# Patient Record
Sex: FEMALE | Race: WHITE
[De-identification: names, ages, dates, MRNs, and addresses within clinical notes are randomized per-mention and may not be internally consistent; named-entity substitution may affect disease eponyms.]

---

## 2020-11-25 ENCOUNTER — HOSPITAL ENCOUNTER (INPATIENT)
Dept: HOSPITAL 75 - ER FS | Age: 60
LOS: 5 days | Discharge: HOME | DRG: 177 | End: 2020-11-30
Attending: INTERNAL MEDICINE | Admitting: INTERNAL MEDICINE
Payer: SELF-PAY

## 2020-11-25 VITALS — HEIGHT: 62.99 IN | BODY MASS INDEX: 42.19 KG/M2 | WEIGHT: 238.1 LBS

## 2020-11-25 VITALS — SYSTOLIC BLOOD PRESSURE: 135 MMHG | DIASTOLIC BLOOD PRESSURE: 64 MMHG

## 2020-11-25 VITALS — DIASTOLIC BLOOD PRESSURE: 67 MMHG | SYSTOLIC BLOOD PRESSURE: 150 MMHG

## 2020-11-25 DIAGNOSIS — J96.01: ICD-10-CM

## 2020-11-25 DIAGNOSIS — E11.65: ICD-10-CM

## 2020-11-25 DIAGNOSIS — Z73.0: ICD-10-CM

## 2020-11-25 DIAGNOSIS — T38.0X5A: ICD-10-CM

## 2020-11-25 DIAGNOSIS — N17.9: ICD-10-CM

## 2020-11-25 DIAGNOSIS — E66.01: ICD-10-CM

## 2020-11-25 DIAGNOSIS — Z79.84: ICD-10-CM

## 2020-11-25 DIAGNOSIS — I10: ICD-10-CM

## 2020-11-25 DIAGNOSIS — U07.1: Primary | ICD-10-CM

## 2020-11-25 DIAGNOSIS — E11.10: ICD-10-CM

## 2020-11-25 LAB
ALBUMIN SERPL-MCNC: 3.4 GM/DL (ref 3.2–4.5)
ALP SERPL-CCNC: 87 U/L (ref 40–136)
ALT SERPL-CCNC: 13 U/L (ref 0–55)
BASOPHILS # BLD AUTO: 0.1 10^3/UL (ref 0–0.1)
BASOPHILS NFR BLD AUTO: 1 % (ref 0–10)
BASOPHILS NFR BLD MANUAL: 0 %
BILIRUB SERPL-MCNC: 0.4 MG/DL (ref 0.1–1)
BUN/CREAT SERPL: 30
CALCIUM SERPL-MCNC: 9.3 MG/DL (ref 8.5–10.1)
CHLORIDE SERPL-SCNC: 106 MMOL/L (ref 98–107)
CO2 SERPL-SCNC: 16 MMOL/L (ref 21–32)
CREAT SERPL-MCNC: 0.73 MG/DL (ref 0.6–1.3)
EOSINOPHIL # BLD AUTO: 0 10^3/UL (ref 0–0.3)
EOSINOPHIL NFR BLD AUTO: 0 % (ref 0–10)
EOSINOPHIL NFR BLD MANUAL: 0 %
GFR SERPLBLD BASED ON 1.73 SQ M-ARVRAT: > 60 ML/MIN
GLUCOSE SERPL-MCNC: 183 MG/DL (ref 70–105)
HCT VFR BLD CALC: 38 % (ref 35–52)
HGB BLD-MCNC: 12.5 G/DL (ref 11.5–16)
LYMPHOCYTES # BLD AUTO: 0.8 X 10^3 (ref 1–4)
LYMPHOCYTES NFR BLD AUTO: 14 % (ref 12–44)
MANUAL DIFFERENTIAL PERFORMED BLD QL: YES
MCH RBC QN AUTO: 30 PG (ref 25–34)
MCHC RBC AUTO-ENTMCNC: 33 G/DL (ref 32–36)
MCV RBC AUTO: 89 FL (ref 80–99)
MONOCYTES # BLD AUTO: 0.2 X 10^3 (ref 0–1)
MONOCYTES NFR BLD AUTO: 4 % (ref 0–12)
MONOCYTES NFR BLD: 4 %
MYELOCYTES NFR BLD: 3 %
NEUTROPHILS # BLD AUTO: 4.6 X 10^3 (ref 1.8–7.8)
NEUTROPHILS NFR BLD AUTO: 76 % (ref 42–75)
NEUTS BAND NFR BLD MANUAL: 56 %
NEUTS BAND NFR BLD: 24 %
PLATELET # BLD: 295 10^3/UL (ref 130–400)
PMV BLD AUTO: 9.8 FL (ref 7.4–10.4)
POTASSIUM SERPL-SCNC: 4 MMOL/L (ref 3.6–5)
PROT SERPL-MCNC: 7.4 GM/DL (ref 6.4–8.2)
RBC MORPH BLD: NORMAL
SODIUM SERPL-SCNC: 141 MMOL/L (ref 135–145)
VARIANT LYMPHS NFR BLD MANUAL: 1 %
VARIANT LYMPHS NFR BLD MANUAL: 10 %
VARIANT LYMPHS NFR BLD MANUAL: 2 %
WBC # BLD AUTO: 6 10^3/UL (ref 4.3–11)

## 2020-11-25 PROCEDURE — 87040 BLOOD CULTURE FOR BACTERIA: CPT

## 2020-11-25 PROCEDURE — 85007 BL SMEAR W/DIFF WBC COUNT: CPT

## 2020-11-25 PROCEDURE — 36415 COLL VENOUS BLD VENIPUNCTURE: CPT

## 2020-11-25 PROCEDURE — 83036 HEMOGLOBIN GLYCOSYLATED A1C: CPT

## 2020-11-25 PROCEDURE — 87804 INFLUENZA ASSAY W/OPTIC: CPT

## 2020-11-25 PROCEDURE — 86900 BLOOD TYPING SEROLOGIC ABO: CPT

## 2020-11-25 PROCEDURE — 80048 BASIC METABOLIC PNL TOTAL CA: CPT

## 2020-11-25 PROCEDURE — 84145 PROCALCITONIN (PCT): CPT

## 2020-11-25 PROCEDURE — 82962 GLUCOSE BLOOD TEST: CPT

## 2020-11-25 PROCEDURE — 71045 X-RAY EXAM CHEST 1 VIEW: CPT

## 2020-11-25 PROCEDURE — 86850 RBC ANTIBODY SCREEN: CPT

## 2020-11-25 PROCEDURE — 83605 ASSAY OF LACTIC ACID: CPT

## 2020-11-25 PROCEDURE — 94761 N-INVAS EAR/PLS OXIMETRY MLT: CPT

## 2020-11-25 PROCEDURE — 82010 KETONE BODYS QUAN: CPT

## 2020-11-25 PROCEDURE — 86901 BLOOD TYPING SEROLOGIC RH(D): CPT

## 2020-11-25 PROCEDURE — 80053 COMPREHEN METABOLIC PANEL: CPT

## 2020-11-25 PROCEDURE — 85379 FIBRIN DEGRADATION QUANT: CPT

## 2020-11-25 PROCEDURE — 85027 COMPLETE CBC AUTOMATED: CPT

## 2020-11-25 PROCEDURE — 85025 COMPLETE CBC W/AUTO DIFF WBC: CPT

## 2020-11-25 PROCEDURE — 94760 N-INVAS EAR/PLS OXIMETRY 1: CPT

## 2020-11-25 RX ADMIN — SODIUM CHLORIDE SCH MLS/HR: 900 INJECTION INTRAVENOUS at 22:56

## 2020-11-25 RX ADMIN — METOPROLOL SUCCINATE SCH MG: 50 TABLET, EXTENDED RELEASE ORAL at 22:57

## 2020-11-25 RX ADMIN — ENOXAPARIN SODIUM SCH MG: 100 INJECTION SUBCUTANEOUS at 22:57

## 2020-11-25 NOTE — DIAGNOSTIC IMAGING REPORT
EXAMINATION: Chest 1 view



HISTORY: Hypoxia, Covid.



COMPARISON: None available.



FINDINGS: 



There are severe bilateral airspace opacities throughout both

lungs. Right costophrenic angle is excluded. No pleural effusion

or pneumothorax is seen. Heart size is normal.



IMPRESSION: 



1. Severe bilateral airspace opacities throughout both lungs

consistent with history of COVID-19 infection.



Dictated by: 



  Dictated on workstation # [email or web address]

## 2020-11-25 NOTE — ED COUGH/URI
General


Chief Complaint:  Cough/Cold/Flu Symptoms


Stated Complaint:  LOW O2


Nursing Triage Note:  


Sent to ED from [facility]. Was seen today due to cough, sore throat, and lost 


voice x 2.5 weeks. States her other symptoms seem to be improving, but she lost 


her voice again. Was sent from clinic due to O2 sats of 88% on room air. Brought




to ED on portable oxygen with O2 sats 92%. Denies fever or shortness of breath 


but is tachypneic at 34 respiration/minute. Was rapid tested at clinic and is 


covid positive.


Sepsis Screen:  No Definite Risk


Source:  patient


Exam Limitations:  no limitations





History of Present Illness


Date Seen by Provider:  Nov 25, 2020


Time Seen by Provider:  16:00


Initial Comments


60-year-old female presents to the ER by private vehicle after she was seen in 

the [facility] today for 2-1/2 weeks of upper respiratory symptoms 

and cough.  A rapid COVID test was positive and her oxygen saturation on room 

air was 88 percent, so advised to come to the ER.  Patient denies shortness of 

breath except with exertion, denies chest pain, chest tightness, fever or 

chills, nausea vomiting or diarrhea.  States she is actually feeling better.





Allergies and Home Medications


Allergies


Coded Allergies:  


     metronidazole (Verified  Allergy, Unknown, vomiting, 11/25/20)


     erythromycin base (Verified  Adverse Reaction, Unknown, vomiting, 11/25/20)





Patient Home Medication List


Home Medication List Reviewed:  Yes





Review of Systems


Review of Systems


Constitutional:  see HPI; No chills, No fever, No malaise, No weakness


EENTM:  No throat pain, No throat swelling


Respiratory:  cough; No hemoptysis, No orthopnea; short of breath


Cardiovascular:  No chest pain, No edema, No palpitations, No syncope


Gastrointestinal:  No abdominal pain, No diarrhea, No nausea, No vomiting


Musculoskeletal:  No back pain, No joint pain





Past Medical-Social-Family Hx


Past Med/Social Hx:  Reviewed Nursing Past Med/Soc Hx


Patient Social History


Alcohol Use:  Denies Use


Recreational Drug Use:  No


Smoking Status:  Never a Smoker


2nd Hand Smoke Exposure:  No


Recent Foreign Travel:  No


Contact w/Someone Who Travel:  No


Recent Infectious Disease Expo:  No


Recent Hopitalizations:  No


Physical Abuse:  No


Sexual Abuse:  No


Mistreated:  No


Fear:  No





Seasonal Allergies


Seasonal Allergies:  No





Past Medical History


Surgeries:  Yes


Orthopedic, Tubal Ligation


Respiratory:  No


Cardiac:  Yes


Hypertension


Neurological:  No


Genitourinary:  No


Gastrointestinal:  No


Musculoskeletal:  No


Endocrine:  Yes


Diabetes, Non-Insulin dep


HEENT:  No


Cancer:  No


Psychosocial:  No


Integumentary:  No


Blood Disorders:  No


Adverse Reaction/Blood Tranf:  No





Physical Exam





Vital Signs - First Documented








 11/25/20





 15:09


 


Temp 36.4


 


Pulse 71


 


Resp 34


 


B/P (MAP) 145/64 (91)


 


Pulse Ox 92


 


O2 Delivery Nasal Cannula


 


O2 Flow Rate 3.00





Capillary Refill : Less Than 3 Seconds


Height: '"


Weight: lbs. oz. kg;  BMI


Method:


General Appearance:  WD/WN, no apparent distress


HEENT:  PERRL/EOMI, normal ENT inspection, TMs normal, pharynx normal


Neck:  supple, normal inspection


Respiratory:  chest non-tender, lungs clear, normal breath sounds, no 

respiratory distress, no accessory muscle use


Cardiovascular:  regular rate, rhythm, no edema, no gallop, no JVD


Gastrointestinal:  non tender, soft


Extremities:  non-tender


Neurologic/Psychiatric:  no motor/sensory deficits, alert, normal mood/affect





Progress/Results/Core Measures


Suspected Sepsis


Recent Fever Within 48 Hours:  No


Infection Criteria Present:  Documented Infection


New/Unexplained  Altered Menta:  No


Sepsis Screen:  No Definite Risk


SIRS


Temperature: 


Pulse: 71 


Respiratory Rate: 34


 


Laboratory Tests


11/25/20 15:20: White Blood Count 6.0


Blood Pressure 145 /64 


Mean: 91


 





Laboratory Tests


11/25/20 15:20: 


Creatinine 0.73, Platelet Count 295, Total Bilirubin 0.4








Results/Orders


Lab Results





Laboratory Tests








Test


 11/25/20


15:20 Range/Units


 


 


White Blood Count


 6.0 


 4.3-11.0


10^3/uL


 


Red Blood Count


 4.22 L


 4.35-5.85


10^6/uL


 


Hemoglobin 12.5  11.5-16.0  G/DL


 


Hematocrit 38  35-52  %


 


Mean Corpuscular Volume 89  80-99  FL


 


Mean Corpuscular Hemoglobin 30  25-34  PG


 


Mean Corpuscular Hemoglobin


Concent 33 


 32-36  G/DL





 


Red Cell Distribution Width 13.5  10.0-14.5  %


 


Platelet Count


 295 


 130-400


10^3/uL


 


Mean Platelet Volume 9.8  7.4-10.4  FL


 


Immature Granulocyte % (Auto) 5   %


 


Neutrophils (%) (Auto) 76 H 42-75  %


 


Lymphocytes (%) (Auto) 14  12-44  %


 


Monocytes (%) (Auto) 4  0-12  %


 


Eosinophils (%) (Auto) 0  0-10  %


 


Basophils (%) (Auto) 1  0-10  %


 


Neutrophils # (Auto) 4.6  1.8-7.8  X 10^3


 


Lymphocytes # (Auto) 0.8 L 1.0-4.0  X 10^3


 


Monocytes # (Auto) 0.2  0.0-1.0  X 10^3


 


Eosinophils # (Auto)


 0.0 


 0.0-0.3


10^3/uL


 


Basophils # (Auto)


 0.1 


 0.0-0.1


10^3/uL


 


Immature Granulocyte # (Auto)


 0.3 H


 0.0-0.1


10^3/uL


 


Neutrophils % (Manual) 56   %


 


Lymphocytes % (Manual) 10   %


 


Monocytes % (Manual) 4   %


 


Eosinophils % (Manual) 0   %


 


Basophils % (Manual) 0   %


 


Myelocytes % 3   %


 


Band Neutrophils 24   %


 


Atypical Lymphocytes 2   %


 


Reactive Lymphocytes 1   %


 


Blood Morphology Comment NORMAL   


 


Sodium Level 141  135-145  MMOL/L


 


Potassium Level 4.0  3.6-5.0  MMOL/L


 


Chloride Level 106    MMOL/L


 


Carbon Dioxide Level 16 L 21-32  MMOL/L


 


Anion Gap 19 H 5-14  MMOL/L


 


Blood Urea Nitrogen 22 H 7-18  MG/DL


 


Creatinine


 0.73 


 0.60-1.30


MG/DL


 


Estimat Glomerular Filtration


Rate > 60 


  





 


BUN/Creatinine Ratio 30   


 


Glucose Level 183 H   MG/DL


 


Calcium Level 9.3  8.5-10.1  MG/DL


 


Corrected Calcium 9.8  8.5-10.1  MG/DL


 


Total Bilirubin 0.4  0.1-1.0  MG/DL


 


Aspartate Amino Transf


(AST/SGOT) 32 


 5-34  U/L





 


Alanine Aminotransferase


(ALT/SGPT) 13 


 0-55  U/L





 


Alkaline Phosphatase 87    U/L


 


Total Protein 7.4  6.4-8.2  GM/DL


 


Albumin 3.4  3.2-4.5  GM/DL








My Orders





Orders - ROVENSTINE,MÓNICA L DO


Ed Iv/Invasive Line Start (11/25/20 16:42)


Cbc With Automated Diff (11/25/20 16:42)


Comprehensive Metabolic Panel (11/25/20 16:42)


Chest 1 View Ap/Pa Only (11/25/20 16:42)


Dexamethasone Injection (Decadron Inje (11/26/20 09:00)


Albuterol/Ipra Inhalation Soln (Duoneb I (11/25/20 16:45)


Svn Small Volume Nebulizer (11/25/20 16:42)


Dexamethasone Injection (Decadron Inje (11/25/20 16:50)


Manual Differential (11/25/20 15:20)


Influenza A And B Antigens (11/25/20 17:52)





Medications Given in ED





Current Medications








 Medications  Dose


 Ordered  Sig/Pushpa


 Route  Start Time


 Stop Time Status Last Admin


Dose Admin


 


 Albuterol/


 Ipratropium  3 ml  ONCE  ONCE


 INH  11/25/20 16:45


 11/25/20 16:46 DC 11/25/20 17:01


3 ML








Vital Signs/I&O











 11/25/20 11/25/20





 15:09 15:14


 


Temp 36.4 


 


Pulse 71 


 


Resp 34 


 


B/P (MAP) 145/64 (91) 


 


Pulse Ox 92 


 


O2 Delivery Nasal Cannula Nasal Cannula


 


O2 Flow Rate 3.00 





Capillary Refill : Less Than 3 Seconds








Blood Pressure Mean:                    91








Progress Note :  


Progress Note


Patient at the tail end of her illness, having symptoms for 2-1/2 weeks and 

today being the first day she is sought any type of medical care.  See history 

of present illness for details, patient requiring oxygen to remain above 90 

percent and desats as low as 82 percent on room air with a short walk in the ER.

 Denies history of lung disease, sleep apnea or ever needing oxygen prior to 

today.





Diagnostic Imaging





   Diagonstic Imaging:  Xray


Comments


Date of Exam:11/25/20





CHEST 1 VIEW AP/PA ONLY








EXAMINATION: Chest 1 view





HISTORY: Hypoxia, Covid.





COMPARISON: None available.





FINDINGS: 





There are severe bilateral airspace opacities throughout both


lungs. Right costophrenic angle is excluded. No pleural effusion


or pneumothorax is seen. Heart size is normal.





IMPRESSION: 





1. Severe bilateral airspace opacities throughout both lungs


consistent with history of COVID-19 infection.





Dictated by: 





  Dictated on workstation # [email or web address]








Dict:   11/25/20 1702


Trans:   11/25/20 0969


[facility] [id number]





Interpreted by:     MICHELLE MENCHACA MD


Electronically signed by: MICHELLE MENCHACA MD 11/25/20 1719





Departure


Communication (Admissions)


Time/Spoke to Admitting Phy:  17:50


spoke to Dr Downing who accepts for admission





Impression





   Primary Impression:  


   COVID-19


   Additional Impression:  


   Hypoxia


Disposition:  01 HOME, SELF-CARE


Condition:  Stable





Admissions


Decision to Admit Reason:  Admit from ER (General)


Decision to Admit/Date:  Nov 25, 2020


Time/Decision to Admit Time:  17:50





Departure-Patient Inst.





Add. Discharge Instructions:  








All discharge instructions reviewed with patient and/or family. Voiced 

understanding.











MÓNICA ESCALONA DO         Nov 25, 2020 16:24

## 2020-11-26 VITALS — SYSTOLIC BLOOD PRESSURE: 126 MMHG | DIASTOLIC BLOOD PRESSURE: 81 MMHG

## 2020-11-26 VITALS — DIASTOLIC BLOOD PRESSURE: 74 MMHG | SYSTOLIC BLOOD PRESSURE: 157 MMHG

## 2020-11-26 VITALS — DIASTOLIC BLOOD PRESSURE: 72 MMHG | SYSTOLIC BLOOD PRESSURE: 143 MMHG

## 2020-11-26 VITALS — SYSTOLIC BLOOD PRESSURE: 143 MMHG | DIASTOLIC BLOOD PRESSURE: 78 MMHG

## 2020-11-26 VITALS — DIASTOLIC BLOOD PRESSURE: 72 MMHG | SYSTOLIC BLOOD PRESSURE: 151 MMHG

## 2020-11-26 VITALS — SYSTOLIC BLOOD PRESSURE: 152 MMHG | DIASTOLIC BLOOD PRESSURE: 68 MMHG

## 2020-11-26 VITALS — SYSTOLIC BLOOD PRESSURE: 112 MMHG | DIASTOLIC BLOOD PRESSURE: 74 MMHG

## 2020-11-26 LAB
ALBUMIN SERPL-MCNC: 3.4 GM/DL (ref 3.2–4.5)
ALP SERPL-CCNC: 77 U/L (ref 40–136)
ALT SERPL-CCNC: 14 U/L (ref 0–55)
BASOPHILS # BLD AUTO: 0.1 10^3/UL (ref 0–0.1)
BASOPHILS NFR BLD AUTO: 1 % (ref 0–10)
BILIRUB SERPL-MCNC: 0.4 MG/DL (ref 0.1–1)
BUN/CREAT SERPL: 22
BUN/CREAT SERPL: 24
BUN/CREAT SERPL: 24
BUN/CREAT SERPL: 26
CALCIUM SERPL-MCNC: 8.8 MG/DL (ref 8.5–10.1)
CALCIUM SERPL-MCNC: 9.1 MG/DL (ref 8.5–10.1)
CALCIUM SERPL-MCNC: 9.2 MG/DL (ref 8.5–10.1)
CALCIUM SERPL-MCNC: 9.4 MG/DL (ref 8.5–10.1)
CHLORIDE SERPL-SCNC: 104 MMOL/L (ref 98–107)
CHLORIDE SERPL-SCNC: 105 MMOL/L (ref 98–107)
CHLORIDE SERPL-SCNC: 106 MMOL/L (ref 98–107)
CHLORIDE SERPL-SCNC: 106 MMOL/L (ref 98–107)
CO2 SERPL-SCNC: 10 MMOL/L (ref 21–32)
CO2 SERPL-SCNC: 11 MMOL/L (ref 21–32)
CO2 SERPL-SCNC: 11 MMOL/L (ref 21–32)
CO2 SERPL-SCNC: 12 MMOL/L (ref 21–32)
CREAT SERPL-MCNC: 1.08 MG/DL (ref 0.6–1.3)
CREAT SERPL-MCNC: 1.11 MG/DL (ref 0.6–1.3)
CREAT SERPL-MCNC: 1.16 MG/DL (ref 0.6–1.3)
CREAT SERPL-MCNC: 1.22 MG/DL (ref 0.6–1.3)
EOSINOPHIL # BLD AUTO: 0 10^3/UL (ref 0–0.3)
EOSINOPHIL NFR BLD AUTO: 0 % (ref 0–10)
GFR SERPLBLD BASED ON 1.73 SQ M-ARVRAT: 45 ML/MIN
GFR SERPLBLD BASED ON 1.73 SQ M-ARVRAT: 48 ML/MIN
GFR SERPLBLD BASED ON 1.73 SQ M-ARVRAT: 50 ML/MIN
GFR SERPLBLD BASED ON 1.73 SQ M-ARVRAT: 52 ML/MIN
GLUCOSE SERPL-MCNC: 286 MG/DL (ref 70–105)
GLUCOSE SERPL-MCNC: 293 MG/DL (ref 70–105)
GLUCOSE SERPL-MCNC: 313 MG/DL (ref 70–105)
GLUCOSE SERPL-MCNC: 329 MG/DL (ref 70–105)
HCT VFR BLD CALC: 39 % (ref 35–52)
HGB BLD-MCNC: 12.1 G/DL (ref 11.5–16)
LYMPHOCYTES # BLD AUTO: 0.6 10^3/UL (ref 1–4)
LYMPHOCYTES NFR BLD AUTO: 13 % (ref 12–44)
MANUAL DIFFERENTIAL PERFORMED BLD QL: NO
MCH RBC QN AUTO: 29 PG (ref 25–34)
MCHC RBC AUTO-ENTMCNC: 31 G/DL (ref 32–36)
MCV RBC AUTO: 94 FL (ref 80–99)
MONOCYTES # BLD AUTO: 0.1 10^3/UL (ref 0–1)
MONOCYTES NFR BLD AUTO: 2 % (ref 0–12)
NEUTROPHILS # BLD AUTO: 3.4 10^3/UL (ref 1.8–7.8)
NEUTROPHILS NFR BLD AUTO: 75 % (ref 42–75)
PLATELET # BLD: 281 10^3/UL (ref 130–400)
PMV BLD AUTO: 9.9 FL (ref 9–12.2)
POTASSIUM SERPL-SCNC: 4.5 MMOL/L (ref 3.6–5)
POTASSIUM SERPL-SCNC: 4.6 MMOL/L (ref 3.6–5)
POTASSIUM SERPL-SCNC: 4.7 MMOL/L (ref 3.6–5)
POTASSIUM SERPL-SCNC: 5 MMOL/L (ref 3.6–5)
PROT SERPL-MCNC: 7.1 GM/DL (ref 6.4–8.2)
SODIUM SERPL-SCNC: 137 MMOL/L (ref 135–145)
SODIUM SERPL-SCNC: 137 MMOL/L (ref 135–145)
SODIUM SERPL-SCNC: 140 MMOL/L (ref 135–145)
SODIUM SERPL-SCNC: 141 MMOL/L (ref 135–145)
WBC # BLD AUTO: 4.6 10^3/UL (ref 4.3–11)

## 2020-11-26 RX ADMIN — POTASSIUM CHLORIDE SCH MLS/HR: 200 INJECTION, SOLUTION INTRAVENOUS at 17:43

## 2020-11-26 RX ADMIN — ENOXAPARIN SODIUM SCH MG: 100 INJECTION SUBCUTANEOUS at 09:17

## 2020-11-26 RX ADMIN — ACETAMINOPHEN PRN MG: 325 TABLET ORAL at 23:03

## 2020-11-26 RX ADMIN — METOPROLOL SUCCINATE SCH MG: 50 TABLET, EXTENDED RELEASE ORAL at 21:38

## 2020-11-26 RX ADMIN — INSULIN ASPART SCH UNIT: 100 INJECTION, SOLUTION INTRAVENOUS; SUBCUTANEOUS at 16:00

## 2020-11-26 RX ADMIN — DEXTROSE MONOHYDRATE AND SODIUM CHLORIDE SCH MLS/HR: 5; .45 INJECTION, SOLUTION INTRAVENOUS at 17:44

## 2020-11-26 RX ADMIN — LOSARTAN POTASSIUM SCH MG: 50 TABLET, FILM COATED ORAL at 09:16

## 2020-11-26 RX ADMIN — SENNOSIDES SCH MG: 8.6 TABLET, FILM COATED ORAL at 09:16

## 2020-11-26 RX ADMIN — DOCUSATE SODIUM SCH MG: 100 CAPSULE ORAL at 21:38

## 2020-11-26 RX ADMIN — POTASSIUM CHLORIDE SCH MLS/HR: 200 INJECTION, SOLUTION INTRAVENOUS at 17:30

## 2020-11-26 RX ADMIN — POTASSIUM CHLORIDE SCH MLS/HR: 200 INJECTION, SOLUTION INTRAVENOUS at 14:30

## 2020-11-26 RX ADMIN — POTASSIUM CHLORIDE SCH MLS/HR: 200 INJECTION, SOLUTION INTRAVENOUS at 23:23

## 2020-11-26 RX ADMIN — SODIUM CHLORIDE SCH MLS/HR: 4.5 INJECTION, SOLUTION INTRAVENOUS at 20:17

## 2020-11-26 RX ADMIN — ENOXAPARIN SODIUM SCH MG: 100 INJECTION SUBCUTANEOUS at 23:04

## 2020-11-26 RX ADMIN — DOCUSATE SODIUM SCH MG: 100 CAPSULE ORAL at 09:16

## 2020-11-26 RX ADMIN — AZITHROMYCIN SCH MG: 250 TABLET, FILM COATED ORAL at 21:38

## 2020-11-26 RX ADMIN — SENNOSIDES SCH MG: 8.6 TABLET, FILM COATED ORAL at 21:38

## 2020-11-26 RX ADMIN — SODIUM CHLORIDE SCH MLS/HR: 4.5 INJECTION, SOLUTION INTRAVENOUS at 14:30

## 2020-11-26 RX ADMIN — INSULIN ASPART SCH UNIT: 100 INJECTION, SOLUTION INTRAVENOUS; SUBCUTANEOUS at 14:26

## 2020-11-26 RX ADMIN — POTASSIUM CHLORIDE SCH MLS/HR: 200 INJECTION, SOLUTION INTRAVENOUS at 20:13

## 2020-11-26 RX ADMIN — INSULIN ASPART SCH UNIT: 100 INJECTION, SOLUTION INTRAVENOUS; SUBCUTANEOUS at 06:25

## 2020-11-26 RX ADMIN — INSULIN ASPART SCH UNIT: 100 INJECTION, SOLUTION INTRAVENOUS; SUBCUTANEOUS at 23:44

## 2020-11-26 RX ADMIN — SODIUM CHLORIDE SCH MLS/HR: 900 INJECTION INTRAVENOUS at 23:03

## 2020-11-26 NOTE — HISTORY & PHYSICAL-HOSPITALIST
History of Present Illness


HPI/Chief Complaint


Tabby Estrada is a 60-year-old female with past medical history of hypertension,

diabetes, morbid obesity, who presented with hypoxia.  She had been out of the 

doctor's visit and was found to be hypoxic and was referred to the emergency 

room.  She had been diagnosed with COVID-19 about 2-1/2 weeks ago.  He states 

that she is beginning to feel better.  She denies shortness of breath.  She 

denies fevers.  She denies abdominal pain, nausea, and vomiting.  She denies 

diarrhea.  She has been eating and drinking.  She takes metformin and Farxiga 

for diabetes.


Source:  patient


Exam Limitations:  no limitations


Date Seen


20


Time Seen by a Provider:  10:55


Attending Physician


Mindi Denny MD


PCP


Kaylene Quezada


Referring Physician





Date of Admission


2020 at 20:40





Home Medications & Allergies


Home Medications


Reviewed patient Home Medication Reconciliation performed by pharmacy medication

reconciliations technician and/or nursing.


Patients Allergies have been reviewed.





Allergies





Allergies


Coded Allergies


  metronidazole (Verified Allergy, Unknown, vomiting, 20)


  erythromycin base (Verified Adverse Reaction, Unknown, vomiting, 20)








Past Medical-Social-Family Hx


Past Med/Social Hx:  Reviewed Nursing Past Med/Soc Hx


Patient Social History


Alcohol Use:  Denies Use


Recreational Drug Use:  No


Smoking Status:  Never a Smoker


2nd Hand Smoke Exposure:  No


Recent Foreign Travel:  No


Contact w/other who traveled:  No


Recent Hopitalizations:  No


Recent Infectious Disease Expo:  No





Seasonal Allergies


Seasonal Allergies:  No





Past Medical History


Surgeries:  Orthopedic, Tubal Ligation


Cardiac:  Hypertension


Endocrine:  Diabetes, Non-Insulin dep


History of Blood Disorders:  No


Adverse Reaction to Blood [place]:  No





Review of Systems


Constitutional:  no symptoms reported, see HPI





Physical Exam


Physical Exam


Vital Signs





Vital Signs - First Documented








 20





 15:09


 


Temp 36.4


 


Pulse 71


 


Resp 34


 


B/P (MAP) 145/64 (91)


 


Pulse Ox 92


 


O2 Delivery Nasal Cannula


 


O2 Flow Rate 3.00





Capillary Refill : Less Than 3 Seconds


Height, Weight, BMI


Height: '"


Weight: lbs. oz. kg; 42.18 BMI


Method:


General Appearance:  No Apparent Distress, Obese


HEENT:  PERRL/EOMI, Pharynx Normal


Neck:  Normal Inspection, Supple


Respiratory:  Lungs Clear, Normal Breath Sounds, No Respiratory Distress


Cardiovascular:  Regular Rate, Rhythm, No Murmur


Gastrointestinal:  Normal Bowel Sounds, Non Tender, Soft


Extremity:  Normal Inspection, Non Tender, Pedal Edema


Neurologic/Psychiatric:  Alert, Oriented x3, No Motor/Sensory Deficits, Normal 

Mood/Affect


Skin:  Normal Color, Warm/Dry





Results


Results/Procedures


Labs


Laboratory Tests


20 15:20








20 04:13








20 11:50








Patient resulted labs reviewed.


Imaging:  Reviewed Imaging Report





Assessment/Plan


Admission Diagnosis


Acute respiratory failure due to COVID-19


Admission Status:  Inpatient Order (span 2 midnights)


Reason for Inpatient Admission:  


DKA requring insulin drip





Assessment and Plan


Acute respiratory failure due to COVID-19


   COVID+ at outside facility


   Flu negative


   Ddimer 2.47


   Started on prophylactic Lovenox


   Procalcitonin normal, antibiotics deferred


   Started on Decadron


   Convalescent plasma ordered


   Remdesivir not indicated, outside treatment window


   Supplemental oxygen as needed





Type 2 diabetes mellitus with ketoacidosis


Steroid induced hyperglycemia


   Blood sugars in mid-200s


   Bicarb low with high anion gap


   Beta hydroxybutyrate elevated


   Begin insulin gtt, DKA protocol


   Transfer to step down





HTN


   Continue home meds





Morbid obesity


   Clinically significant, no acute management needs





DVT Prophylaxis: Lovenox





Diagnosis/Problems


Diagnosis/Problems





(1) Acute respiratory failure due to COVID-19


Status:  Acute


(2) T2DM (type 2 diabetes mellitus)


Status:  Acute


Qualifiers:  


   Diabetes mellitus long term insulin use:  without long term use  Diabetes 

mellitus complication status:  with ketoacidosis  Diabetes mellitus complication

detail:  without coma  Qualified Codes:  E11.10 - Type 2 diabetes mellitus with 

ketoacidosis without coma


(3) Steroid-induced hyperglycemia


Status:  Acute


(4) HTN (hypertension)


Status:  Chronic


(5) Morbid obesity


Status:  Chronic





Clinical Quality Measures


DVT/VTE Risk/Contraindication:


Risk Factor Score Per Nursin


RFS Level Per Nursing on Admit:  4+=Very High











MINDI DENNY MD              2020 12:38

## 2020-11-26 NOTE — NUR
Report given to ASIM Resendez. No questions or concerns voiced. Patient transported to room 511 
via wheelchair with portable oxygen at 6L. Patient placed in recliner and hooked up to 
oxygen in room at 6L. All belongings with patient and call light within reach.

## 2020-11-27 VITALS — SYSTOLIC BLOOD PRESSURE: 175 MMHG | DIASTOLIC BLOOD PRESSURE: 95 MMHG

## 2020-11-27 VITALS — DIASTOLIC BLOOD PRESSURE: 82 MMHG | SYSTOLIC BLOOD PRESSURE: 142 MMHG

## 2020-11-27 VITALS — SYSTOLIC BLOOD PRESSURE: 142 MMHG | DIASTOLIC BLOOD PRESSURE: 82 MMHG

## 2020-11-27 VITALS — SYSTOLIC BLOOD PRESSURE: 134 MMHG | DIASTOLIC BLOOD PRESSURE: 74 MMHG

## 2020-11-27 VITALS — SYSTOLIC BLOOD PRESSURE: 163 MMHG | DIASTOLIC BLOOD PRESSURE: 79 MMHG

## 2020-11-27 VITALS — SYSTOLIC BLOOD PRESSURE: 134 MMHG | DIASTOLIC BLOOD PRESSURE: 78 MMHG

## 2020-11-27 VITALS — DIASTOLIC BLOOD PRESSURE: 85 MMHG | SYSTOLIC BLOOD PRESSURE: 144 MMHG

## 2020-11-27 VITALS — DIASTOLIC BLOOD PRESSURE: 79 MMHG | SYSTOLIC BLOOD PRESSURE: 163 MMHG

## 2020-11-27 VITALS — DIASTOLIC BLOOD PRESSURE: 74 MMHG | SYSTOLIC BLOOD PRESSURE: 133 MMHG

## 2020-11-27 LAB
BUN/CREAT SERPL: 29
BUN/CREAT SERPL: 29
CALCIUM SERPL-MCNC: 8.3 MG/DL (ref 8.5–10.1)
CALCIUM SERPL-MCNC: 8.4 MG/DL (ref 8.5–10.1)
CHLORIDE SERPL-SCNC: 108 MMOL/L (ref 98–107)
CHLORIDE SERPL-SCNC: 108 MMOL/L (ref 98–107)
CO2 SERPL-SCNC: 15 MMOL/L (ref 21–32)
CO2 SERPL-SCNC: 16 MMOL/L (ref 21–32)
CREAT SERPL-MCNC: 0.77 MG/DL (ref 0.6–1.3)
CREAT SERPL-MCNC: 0.89 MG/DL (ref 0.6–1.3)
GFR SERPLBLD BASED ON 1.73 SQ M-ARVRAT: > 60 ML/MIN
GFR SERPLBLD BASED ON 1.73 SQ M-ARVRAT: > 60 ML/MIN
GLUCOSE SERPL-MCNC: 175 MG/DL (ref 70–105)
GLUCOSE SERPL-MCNC: 220 MG/DL (ref 70–105)
POTASSIUM SERPL-SCNC: 4.1 MMOL/L (ref 3.6–5)
POTASSIUM SERPL-SCNC: 4.2 MMOL/L (ref 3.6–5)
SODIUM SERPL-SCNC: 136 MMOL/L (ref 135–145)
SODIUM SERPL-SCNC: 137 MMOL/L (ref 135–145)

## 2020-11-27 PROCEDURE — XW13325 TRANSFUSION OF CONVALESCENT PLASMA (NONAUTOLOGOUS) INTO PERIPHERAL VEIN, PERCUTANEOUS APPROACH, NEW TECHNOLOGY GROUP 5: ICD-10-PCS | Performed by: INTERNAL MEDICINE

## 2020-11-27 RX ADMIN — SODIUM CHLORIDE SCH MLS/HR: 4.5 INJECTION, SOLUTION INTRAVENOUS at 13:47

## 2020-11-27 RX ADMIN — POTASSIUM CHLORIDE SCH MLS/HR: 200 INJECTION, SOLUTION INTRAVENOUS at 22:00

## 2020-11-27 RX ADMIN — INSULIN ASPART SCH UNIT: 100 INJECTION, SOLUTION INTRAVENOUS; SUBCUTANEOUS at 06:28

## 2020-11-27 RX ADMIN — SODIUM CHLORIDE SCH MLS/HR: 900 INJECTION INTRAVENOUS at 21:58

## 2020-11-27 RX ADMIN — ENOXAPARIN SODIUM SCH MG: 100 INJECTION SUBCUTANEOUS at 09:25

## 2020-11-27 RX ADMIN — DOCUSATE SODIUM SCH MG: 100 CAPSULE ORAL at 08:02

## 2020-11-27 RX ADMIN — SODIUM CHLORIDE SCH MLS/HR: 4.5 INJECTION, SOLUTION INTRAVENOUS at 08:02

## 2020-11-27 RX ADMIN — LOSARTAN POTASSIUM SCH MG: 50 TABLET, FILM COATED ORAL at 08:05

## 2020-11-27 RX ADMIN — POTASSIUM CHLORIDE SCH MLS/HR: 200 INJECTION, SOLUTION INTRAVENOUS at 01:20

## 2020-11-27 RX ADMIN — DEXTROSE MONOHYDRATE AND SODIUM CHLORIDE SCH MLS/HR: 5; .45 INJECTION, SOLUTION INTRAVENOUS at 05:59

## 2020-11-27 RX ADMIN — SODIUM CHLORIDE SCH MLS/HR: 4.5 INJECTION, SOLUTION INTRAVENOUS at 09:23

## 2020-11-27 RX ADMIN — ACETAMINOPHEN PRN MG: 325 TABLET ORAL at 20:16

## 2020-11-27 RX ADMIN — DEXTROSE MONOHYDRATE AND SODIUM CHLORIDE SCH MLS/HR: 5; .45 INJECTION, SOLUTION INTRAVENOUS at 13:46

## 2020-11-27 RX ADMIN — INSULIN ASPART SCH UNIT: 100 INJECTION, SOLUTION INTRAVENOUS; SUBCUTANEOUS at 20:13

## 2020-11-27 RX ADMIN — SENNOSIDES SCH MG: 8.6 TABLET, FILM COATED ORAL at 08:03

## 2020-11-27 RX ADMIN — DEXTROSE MONOHYDRATE AND SODIUM CHLORIDE SCH MLS/HR: 5; .45 INJECTION, SOLUTION INTRAVENOUS at 21:59

## 2020-11-27 RX ADMIN — DOCUSATE SODIUM SCH MG: 100 CAPSULE ORAL at 20:12

## 2020-11-27 RX ADMIN — DEXTROSE MONOHYDRATE AND SODIUM CHLORIDE SCH MLS/HR: 5; .45 INJECTION, SOLUTION INTRAVENOUS at 17:24

## 2020-11-27 RX ADMIN — POTASSIUM CHLORIDE SCH MLS/HR: 200 INJECTION, SOLUTION INTRAVENOUS at 20:09

## 2020-11-27 RX ADMIN — SODIUM CHLORIDE SCH MLS/HR: 4.5 INJECTION, SOLUTION INTRAVENOUS at 22:44

## 2020-11-27 RX ADMIN — SODIUM CHLORIDE SCH MLS/HR: 4.5 INJECTION, SOLUTION INTRAVENOUS at 11:25

## 2020-11-27 RX ADMIN — POTASSIUM CHLORIDE SCH MLS/HR: 200 INJECTION, SOLUTION INTRAVENOUS at 03:37

## 2020-11-27 RX ADMIN — ENOXAPARIN SODIUM SCH MG: 100 INJECTION SUBCUTANEOUS at 21:59

## 2020-11-27 RX ADMIN — INSULIN ASPART SCH UNIT: 100 INJECTION, SOLUTION INTRAVENOUS; SUBCUTANEOUS at 17:24

## 2020-11-27 RX ADMIN — INSULIN ASPART SCH UNIT: 100 INJECTION, SOLUTION INTRAVENOUS; SUBCUTANEOUS at 10:08

## 2020-11-27 RX ADMIN — AZITHROMYCIN SCH MG: 250 TABLET, FILM COATED ORAL at 20:09

## 2020-11-27 RX ADMIN — INSULIN ASPART SCH UNIT: 100 INJECTION, SOLUTION INTRAVENOUS; SUBCUTANEOUS at 12:28

## 2020-11-27 RX ADMIN — METOPROLOL SUCCINATE SCH MG: 50 TABLET, EXTENDED RELEASE ORAL at 20:10

## 2020-11-27 RX ADMIN — SENNOSIDES SCH MG: 8.6 TABLET, FILM COATED ORAL at 20:12

## 2020-11-27 NOTE — PROGRESS NOTE - HOSPITALIST
Subjective


HPI/CC On Admission


Date Seen by Provider:  2020


Time Seen by Provider:  09:20


Tabby Estrada is a 60-year-old female with past medical history of hypertension,

diabetes, morbid obesity, who presented with hypoxia.  She had been out of the 

doctor's visit and was found to be hypoxic and was referred to the emergency 

room.  She had been diagnosed with COVID-19 about 2-1/2 weeks ago.  He states 

that she is beginning to feel better.  She denies shortness of breath.  She 

denies fevers.  She denies abdominal pain, nausea, and vomiting.  She denies 

diarrhea.  She has been eating and drinking.  She takes metformin and Farxiga 

for diabetes.


Subjective/Events-last exam


She reports feeling better today.  She denies any shortness of breath.  She 

still has a little bit of a cough.  She denies any fevers or chills.  She denies

any nausea or vomiting.  She denies any abdominal pain.  She denies any 

diarrhea.  She has no other complaints or concerns.





Focused Exam


Lactate Level


20 22:35: Lactic Acid Level 0.68








Objective


Exam


Vital Signs





Vital Signs








  Date Time  Temp Pulse Resp B/P (MAP) Pulse Ox O2 Delivery O2 Flow Rate FiO2


 


20 08:00 36.8 73 20 134/78 (96) 94 Nasal Cannula 3.00 





Capillary Refill : Less Than 3 Seconds


General Appearance:  No Apparent Distress, Obese


Respiratory:  Lungs Clear, Normal Breath Sounds, No Respiratory Distress


Cardiovascular:  Regular Rate, Rhythm, No Edema, No Murmur


Gastrointestinal:  Normal Bowel Sounds, Non Tender, Soft


Extremity:  Normal Inspection, Non Tender, No Pedal Edema


Neurologic/Psychiatric:  Alert, Oriented x3, No Motor/Sensory Deficits, Normal 

Mood/Affect


Skin:  Normal Color, Warm/Dry





Results/Procedures


Lab


Laboratory Tests


20 11:50








20 14:25








20 19:10








20 03:45








Patient resulted labs reviewed.


Imaging:  Reviewed Imaging Report





Assessment/Plan


Assessment and Plan


Assess & Plan/Chief Complaint


Acute respiratory failure due to COVID-19


   Decadron


   Convalescent plasma ordered


   Remdesivir not indicated, outside treatment window


   Supplemental oxygen as needed





Type 2 diabetes mellitus with ketoacidosis


Steroid induced hyperglycemia


   Remains acidotic with hyperglycemia and elevated ketones


   Continue insulin gtt


   High insulin requirements


   Begin Levemir


   Add Novolog with meals





HTN


   Continue home meds





Morbid obesity


   Clinically significant, no acute management needs





DVT Prophylaxis: Lovenox





Diagnosis/Problems


Diagnosis/Problems





(1) Acute respiratory failure due to COVID-19


Status:  Acute


(2) T2DM (type 2 diabetes mellitus)


Status:  Acute


Qualifiers:  


   Diabetes mellitus long term insulin use:  without long term use  Diabetes 

mellitus complication status:  with ketoacidosis  Diabetes mellitus complication

detail:  without coma  Qualified Codes:  E11.10 - Type 2 diabetes mellitus with 

ketoacidosis without coma


(3) Steroid-induced hyperglycemia


Status:  Acute


(4) HTN (hypertension)


Status:  Chronic


(5) Morbid obesity


Status:  Chronic





Clinical Quality Measures


DVT/VTE Risk/Contraindication:


Risk Factor Score Per Nursin


RFS Level Per Nursing on Admit:  4+=Very High











NADER DENNY MD              2020 11:26

## 2020-11-28 VITALS — SYSTOLIC BLOOD PRESSURE: 183 MMHG | DIASTOLIC BLOOD PRESSURE: 97 MMHG

## 2020-11-28 VITALS — DIASTOLIC BLOOD PRESSURE: 86 MMHG | SYSTOLIC BLOOD PRESSURE: 180 MMHG

## 2020-11-28 VITALS — DIASTOLIC BLOOD PRESSURE: 94 MMHG | SYSTOLIC BLOOD PRESSURE: 179 MMHG

## 2020-11-28 VITALS — DIASTOLIC BLOOD PRESSURE: 85 MMHG | SYSTOLIC BLOOD PRESSURE: 185 MMHG

## 2020-11-28 VITALS — DIASTOLIC BLOOD PRESSURE: 87 MMHG | SYSTOLIC BLOOD PRESSURE: 165 MMHG

## 2020-11-28 LAB
BUN/CREAT SERPL: 26
CALCIUM SERPL-MCNC: 8.2 MG/DL (ref 8.5–10.1)
CHLORIDE SERPL-SCNC: 110 MMOL/L (ref 98–107)
CO2 SERPL-SCNC: 17 MMOL/L (ref 21–32)
CREAT SERPL-MCNC: 0.66 MG/DL (ref 0.6–1.3)
GFR SERPLBLD BASED ON 1.73 SQ M-ARVRAT: > 60 ML/MIN
GLUCOSE SERPL-MCNC: 132 MG/DL (ref 70–105)
POTASSIUM SERPL-SCNC: 3.9 MMOL/L (ref 3.6–5)
SODIUM SERPL-SCNC: 138 MMOL/L (ref 135–145)

## 2020-11-28 RX ADMIN — CARVEDILOL SCH MG: 12.5 TABLET, FILM COATED ORAL at 08:32

## 2020-11-28 RX ADMIN — DEXTROSE MONOHYDRATE AND SODIUM CHLORIDE SCH MLS/HR: 5; .45 INJECTION, SOLUTION INTRAVENOUS at 07:14

## 2020-11-28 RX ADMIN — SODIUM CHLORIDE SCH MLS/HR: 4.5 INJECTION, SOLUTION INTRAVENOUS at 14:23

## 2020-11-28 RX ADMIN — SODIUM CHLORIDE SCH MLS/HR: 4.5 INJECTION, SOLUTION INTRAVENOUS at 09:41

## 2020-11-28 RX ADMIN — SODIUM CHLORIDE SCH MLS/HR: 4.5 INJECTION, SOLUTION INTRAVENOUS at 14:24

## 2020-11-28 RX ADMIN — DEXTROSE MONOHYDRATE AND SODIUM CHLORIDE SCH MLS/HR: 5; .45 INJECTION, SOLUTION INTRAVENOUS at 01:48

## 2020-11-28 RX ADMIN — INSULIN ASPART SCH UNIT: 100 INJECTION, SOLUTION INTRAVENOUS; SUBCUTANEOUS at 16:30

## 2020-11-28 RX ADMIN — LOSARTAN POTASSIUM SCH MG: 50 TABLET, FILM COATED ORAL at 05:03

## 2020-11-28 RX ADMIN — POTASSIUM CHLORIDE SCH MLS/HR: 200 INJECTION, SOLUTION INTRAVENOUS at 01:47

## 2020-11-28 RX ADMIN — SODIUM CHLORIDE SCH MLS/HR: 900 INJECTION, SOLUTION INTRAVENOUS at 16:30

## 2020-11-28 RX ADMIN — LOSARTAN POTASSIUM SCH MG: 50 TABLET, FILM COATED ORAL at 08:31

## 2020-11-28 RX ADMIN — CEFDINIR SCH MG: 300 CAPSULE ORAL at 21:30

## 2020-11-28 RX ADMIN — DOCUSATE SODIUM SCH MG: 100 CAPSULE ORAL at 21:30

## 2020-11-28 RX ADMIN — POTASSIUM CHLORIDE SCH MLS/HR: 200 INJECTION, SOLUTION INTRAVENOUS at 07:14

## 2020-11-28 RX ADMIN — INSULIN ASPART SCH UNIT: 100 INJECTION, SOLUTION INTRAVENOUS; SUBCUTANEOUS at 08:33

## 2020-11-28 RX ADMIN — INSULIN ASPART SCH UNIT: 100 INJECTION, SOLUTION INTRAVENOUS; SUBCUTANEOUS at 12:46

## 2020-11-28 RX ADMIN — CARVEDILOL SCH MG: 12.5 TABLET, FILM COATED ORAL at 21:30

## 2020-11-28 RX ADMIN — ENOXAPARIN SODIUM SCH MG: 100 INJECTION SUBCUTANEOUS at 21:30

## 2020-11-28 RX ADMIN — INSULIN ASPART SCH UNIT: 100 INJECTION, SOLUTION INTRAVENOUS; SUBCUTANEOUS at 21:31

## 2020-11-28 RX ADMIN — SODIUM CHLORIDE SCH MLS/HR: 4.5 INJECTION, SOLUTION INTRAVENOUS at 16:08

## 2020-11-28 RX ADMIN — SENNOSIDES SCH MG: 8.6 TABLET, FILM COATED ORAL at 21:30

## 2020-11-28 RX ADMIN — SODIUM CHLORIDE SCH MLS/HR: 4.5 INJECTION, SOLUTION INTRAVENOUS at 18:41

## 2020-11-28 RX ADMIN — POTASSIUM CHLORIDE SCH MLS/HR: 200 INJECTION, SOLUTION INTRAVENOUS at 04:54

## 2020-11-28 RX ADMIN — DOCUSATE SODIUM SCH MG: 100 CAPSULE ORAL at 08:31

## 2020-11-28 RX ADMIN — SENNOSIDES SCH MG: 8.6 TABLET, FILM COATED ORAL at 08:31

## 2020-11-28 RX ADMIN — INSULIN ASPART SCH UNIT: 100 INJECTION, SOLUTION INTRAVENOUS; SUBCUTANEOUS at 06:20

## 2020-11-28 RX ADMIN — ENOXAPARIN SODIUM SCH MG: 100 INJECTION SUBCUTANEOUS at 09:42

## 2020-11-28 RX ADMIN — SODIUM CHLORIDE SCH MLS/HR: 4.5 INJECTION, SOLUTION INTRAVENOUS at 23:28

## 2020-11-28 NOTE — PROGRESS NOTE - HOSPITALIST
Subjective


HPI/CC On Admission


Date Seen by Provider:  2020


Time Seen by Provider:  10:20


Tabby Estrada is a 60-year-old female with past medical history of hypertension,

diabetes, morbid obesity, who presented with hypoxia.  She had been out of the 

doctor's visit and was found to be hypoxic and was referred to the emergency 

room.  She had been diagnosed with COVID-19 about 2-1/2 weeks ago.  He states 

that she is beginning to feel better.  She denies shortness of breath.  She 

denies fevers.  She denies abdominal pain, nausea, and vomiting.  She denies 

diarrhea.  She has been eating and drinking.  She takes metformin and Farxiga 

for diabetes.


Subjective/Events-last exam


she is feeling better.  She has no complaints or concerns.  She denies any 

shortness of breath.  She denies any cough.  She denies any fevers.  She denies 

any nausea or vomiting.  She denies any abdominal pain.  She denies any chest 

pain.  She has been eating and drinking without issue.





Focused Exam


Lactate Level


20 22:35: Lactic Acid Level 0.68








Objective


Exam


Vital Signs





Vital Signs








  Date Time  Temp Pulse Resp B/P (MAP) Pulse Ox O2 Delivery O2 Flow Rate FiO2


 


20 08:00 36.4 72 16 179/94 (122) 95 Nasal Cannula 2.00 





Capillary Refill : Less Than 3 Seconds


General Appearance:  No Apparent Distress, Obese


Respiratory:  Lungs Clear, Normal Breath Sounds, No Respiratory Distress


Cardiovascular:  Regular Rate, Rhythm, No Edema, No Murmur


Gastrointestinal:  Normal Bowel Sounds, Non Tender, Soft


Extremity:  Normal Inspection, No Pedal Edema


Neurologic/Psychiatric:  Alert, Oriented x3, No Motor/Sensory Deficits, Normal 

Mood/Affect


Skin:  Normal Color, Warm/Dry





Results/Procedures


Lab


Laboratory Tests


20 19:17








20 03:33








Patient resulted labs reviewed.


Imaging:  Reviewed Imaging Report





Assessment/Plan


Assessment and Plan


Assess & Plan/Chief Complaint


Acute respiratory failure due to COVID-19


   Decadron


   s/p 1 unit convalescent plasma


   Remdesivir not indicated, outside treatment window


   Supplemental oxygen as needed, requirement improving





Type 2 diabetes mellitus with ketoacidosis


Steroid induced hyperglycemia


   Acidosis improved, anion gap closed


   Transition off insulin gtt


   Levemir 30 units this morning


   Novolog 10 units with meals


   Add sliding scale C


   Transfer to 4th floor





HTN


   Increase Losartan


   Transition from Toprol to Coreg


   Hydralazine as needed





Morbid obesity


   Clinically significant, no acute management needs





DVT Prophylaxis: Lovenox





Diagnosis/Problems


Diagnosis/Problems





(1) Acute respiratory failure due to COVID-19


Status:  Acute


(2) T2DM (type 2 diabetes mellitus)


Status:  Acute


Qualifiers:  


   Diabetes mellitus long term insulin use:  without long term use  Diabetes 

mellitus complication status:  with ketoacidosis  Diabetes mellitus complication

detail:  without coma  Qualified Codes:  E11.10 - Type 2 diabetes mellitus with 

ketoacidosis without coma


(3) Steroid-induced hyperglycemia


Status:  Acute


(4) HTN (hypertension)


Status:  Chronic


(5) Morbid obesity


Status:  Chronic





Clinical Quality Measures


DVT/VTE Risk/Contraindication:


Risk Factor Score Per Nursin


RFS Level Per Nursing on Admit:  4+=Very High











NADER DENNY MD              2020 12:24

## 2020-11-28 NOTE — NUR
DR. DENNY NOTIFIED OF AM LABS, CURRENT INSULIN GTT AT 3U/ML, CURRENT BLOOD SUGAR , 
AND BLOOD PRESSURE /95 AND THAT AM COZAAR WAS GIVEN ALREADY.  INQUIRED ABOUT D/C'ING 
INSULIN GTT. NO NEW ORDERS AT THIS TIME.

## 2020-11-29 VITALS — SYSTOLIC BLOOD PRESSURE: 193 MMHG | DIASTOLIC BLOOD PRESSURE: 99 MMHG

## 2020-11-29 VITALS — DIASTOLIC BLOOD PRESSURE: 92 MMHG | SYSTOLIC BLOOD PRESSURE: 191 MMHG

## 2020-11-29 VITALS — DIASTOLIC BLOOD PRESSURE: 84 MMHG | SYSTOLIC BLOOD PRESSURE: 175 MMHG

## 2020-11-29 VITALS — DIASTOLIC BLOOD PRESSURE: 91 MMHG | SYSTOLIC BLOOD PRESSURE: 198 MMHG

## 2020-11-29 VITALS — SYSTOLIC BLOOD PRESSURE: 169 MMHG | DIASTOLIC BLOOD PRESSURE: 91 MMHG

## 2020-11-29 LAB
BUN/CREAT SERPL: 24
CALCIUM SERPL-MCNC: 8.6 MG/DL (ref 8.5–10.1)
CHLORIDE SERPL-SCNC: 112 MMOL/L (ref 98–107)
CO2 SERPL-SCNC: 19 MMOL/L (ref 21–32)
CREAT SERPL-MCNC: 0.62 MG/DL (ref 0.6–1.3)
GFR SERPLBLD BASED ON 1.73 SQ M-ARVRAT: > 60 ML/MIN
GLUCOSE SERPL-MCNC: 68 MG/DL (ref 70–105)
POTASSIUM SERPL-SCNC: 3.7 MMOL/L (ref 3.6–5)
SODIUM SERPL-SCNC: 144 MMOL/L (ref 135–145)

## 2020-11-29 RX ADMIN — SENNOSIDES SCH MG: 8.6 TABLET, FILM COATED ORAL at 21:52

## 2020-11-29 RX ADMIN — DOCUSATE SODIUM SCH MG: 100 CAPSULE ORAL at 10:11

## 2020-11-29 RX ADMIN — SENNOSIDES SCH MG: 8.6 TABLET, FILM COATED ORAL at 10:09

## 2020-11-29 RX ADMIN — LOSARTAN POTASSIUM SCH MG: 50 TABLET, FILM COATED ORAL at 10:08

## 2020-11-29 RX ADMIN — SODIUM CHLORIDE SCH MLS/HR: 900 INJECTION, SOLUTION INTRAVENOUS at 02:22

## 2020-11-29 RX ADMIN — ENOXAPARIN SODIUM SCH MG: 100 INJECTION SUBCUTANEOUS at 10:09

## 2020-11-29 RX ADMIN — CEFDINIR SCH MG: 300 CAPSULE ORAL at 10:09

## 2020-11-29 RX ADMIN — ENOXAPARIN SODIUM SCH MG: 100 INJECTION SUBCUTANEOUS at 22:08

## 2020-11-29 RX ADMIN — INSULIN ASPART SCH UNIT: 100 INJECTION, SOLUTION INTRAVENOUS; SUBCUTANEOUS at 22:07

## 2020-11-29 RX ADMIN — INSULIN ASPART SCH UNIT: 100 INJECTION, SOLUTION INTRAVENOUS; SUBCUTANEOUS at 16:50

## 2020-11-29 RX ADMIN — DOCUSATE SODIUM SCH MG: 100 CAPSULE ORAL at 21:52

## 2020-11-29 RX ADMIN — INSULIN ASPART SCH UNIT: 100 INJECTION, SOLUTION INTRAVENOUS; SUBCUTANEOUS at 06:45

## 2020-11-29 RX ADMIN — INSULIN ASPART SCH UNIT: 100 INJECTION, SOLUTION INTRAVENOUS; SUBCUTANEOUS at 08:19

## 2020-11-29 RX ADMIN — HYDRALAZINE HYDROCHLORIDE PRN MG: 25 TABLET ORAL at 03:50

## 2020-11-29 RX ADMIN — HYDRALAZINE HYDROCHLORIDE PRN MG: 25 TABLET ORAL at 16:49

## 2020-11-29 RX ADMIN — CARVEDILOL SCH MG: 12.5 TABLET, FILM COATED ORAL at 10:09

## 2020-11-29 RX ADMIN — CEFDINIR SCH MG: 300 CAPSULE ORAL at 22:07

## 2020-11-29 RX ADMIN — SODIUM CHLORIDE SCH MLS/HR: 4.5 INJECTION, SOLUTION INTRAVENOUS at 03:31

## 2020-11-29 RX ADMIN — INSULIN ASPART SCH UNIT: 100 INJECTION, SOLUTION INTRAVENOUS; SUBCUTANEOUS at 10:11

## 2020-11-29 RX ADMIN — INSULIN ASPART SCH UNIT: 100 INJECTION, SOLUTION INTRAVENOUS; SUBCUTANEOUS at 16:49

## 2020-11-29 RX ADMIN — CARVEDILOL SCH MG: 12.5 TABLET, FILM COATED ORAL at 22:07

## 2020-11-29 NOTE — PROGRESS NOTE - HOSPITALIST
Subjective


HPI/CC On Admission


Date Seen by Provider:  2020


Time Seen by Provider:  11:15


Tabby Estrada is a 60-year-old female with past medical history of hypertension,

diabetes, morbid obesity, who presented with hypoxia.  She had been out of the 

doctor's visit and was found to be hypoxic and was referred to the emergency 

room.  She had been diagnosed with COVID-19 about 2-1/2 weeks ago.  He states 

that she is beginning to feel better.  She denies shortness of breath.  She 

denies fevers.  She denies abdominal pain, nausea, and vomiting.  She denies 

diarrhea.  She has been eating and drinking.  She takes metformin and Farxiga 

for diabetes.


Subjective/Events-last exam


She reports feeling better today. She is not having any shortness of breath. She

is not having fevers. She denies nausea and vomiting. She denies abdominal pain.





Objective


Exam


Vital Signs





Vital Signs








  Date Time  Temp Pulse Resp B/P (MAP) Pulse Ox O2 Delivery O2 Flow Rate FiO2


 


20 16:12 37.0 72 20 198/91 (126) 95 Nasal Cannula 2.00 





Capillary Refill : Less Than 3 Seconds


General Appearance:  No Apparent Distress, Obese


Respiratory:  Lungs Clear, Normal Breath Sounds, No Respiratory Distress


Cardiovascular:  Regular Rate, Rhythm, No Edema, No Murmur


Gastrointestinal:  Normal Bowel Sounds, Non Tender, Soft


Extremity:  Normal Inspection, Non Tender, No Pedal Edema


Neurologic/Psychiatric:  Alert, Oriented x3, No Motor/Sensory Deficits, Normal 

Mood/Affect


Skin:  Normal Color, Warm/Dry





Results/Procedures


Lab


Laboratory Tests


20 05:22








Patient resulted labs reviewed.


Imaging:  Reviewed Imaging Report





Assessment/Plan


Assessment and Plan


Assess & Plan/Chief Complaint


Acute respiratory failure due to COVID-19


   Decadron


   s/p 1 unit convalescent plasma


   Remdesivir not indicated, outside treatment window


   Supplemental oxygen as needed, requirement improving





Type 2 diabetes mellitus with hypoglycemia


Steroid induced hyperglycemia


   Blood sugar 55 this morning


   Decrease Levemir


   Decrease Novolog


   Switch to sliding scale B





JEREL


   Cr improved


   IV fluids





HTN


   Losartan


   Coreg


   Add Amlodipine


   Hydralazine as needed





Morbid obesity


   Clinically significant, no acute management needs





DVT Prophylaxis: Lovenox





Diagnosis/Problems


Diagnosis/Problems





(1) Acute respiratory failure due to COVID-19


Status:  Acute


(2) T2DM (type 2 diabetes mellitus)


Status:  Acute


Qualifiers:  


   Diabetes mellitus long term insulin use:  without long term use  Diabetes 

mellitus complication status:  with ketoacidosis  Diabetes mellitus complication

detail:  without coma  Qualified Codes:  E11.10 - Type 2 diabetes mellitus with 

ketoacidosis without coma


(3) Steroid-induced hyperglycemia


Status:  Acute


(4) HTN (hypertension)


Status:  Chronic


(5) Morbid obesity


Status:  Chronic


(6) JEREL (acute kidney injury)


Status:  Acute





Clinical Quality Measures


DVT/VTE Risk/Contraindication:


Risk Factor Score Per Nursin


RFS Level Per Nursing on Admit:  4+=Very High











NADER DENNY MD              2020 16:44

## 2020-11-30 VITALS — SYSTOLIC BLOOD PRESSURE: 154 MMHG | DIASTOLIC BLOOD PRESSURE: 79 MMHG

## 2020-11-30 VITALS — DIASTOLIC BLOOD PRESSURE: 79 MMHG | SYSTOLIC BLOOD PRESSURE: 154 MMHG

## 2020-11-30 VITALS — SYSTOLIC BLOOD PRESSURE: 191 MMHG | DIASTOLIC BLOOD PRESSURE: 91 MMHG

## 2020-11-30 VITALS — DIASTOLIC BLOOD PRESSURE: 94 MMHG | SYSTOLIC BLOOD PRESSURE: 179 MMHG

## 2020-11-30 VITALS — DIASTOLIC BLOOD PRESSURE: 90 MMHG | SYSTOLIC BLOOD PRESSURE: 199 MMHG

## 2020-11-30 LAB
BUN/CREAT SERPL: 22
CALCIUM SERPL-MCNC: 8.4 MG/DL (ref 8.5–10.1)
CHLORIDE SERPL-SCNC: 107 MMOL/L (ref 98–107)
CO2 SERPL-SCNC: 21 MMOL/L (ref 21–32)
CREAT SERPL-MCNC: 0.63 MG/DL (ref 0.6–1.3)
GFR SERPLBLD BASED ON 1.73 SQ M-ARVRAT: > 60 ML/MIN
GLUCOSE SERPL-MCNC: 129 MG/DL (ref 70–105)
POTASSIUM SERPL-SCNC: 3.5 MMOL/L (ref 3.6–5)
SODIUM SERPL-SCNC: 142 MMOL/L (ref 135–145)

## 2020-11-30 RX ADMIN — SENNOSIDES SCH MG: 8.6 TABLET, FILM COATED ORAL at 08:48

## 2020-11-30 RX ADMIN — LOSARTAN POTASSIUM SCH MG: 50 TABLET, FILM COATED ORAL at 08:54

## 2020-11-30 RX ADMIN — SENNOSIDES SCH MG: 8.6 TABLET, FILM COATED ORAL at 09:52

## 2020-11-30 RX ADMIN — CEFDINIR SCH MG: 300 CAPSULE ORAL at 08:53

## 2020-11-30 RX ADMIN — CARVEDILOL SCH MG: 12.5 TABLET, FILM COATED ORAL at 08:53

## 2020-11-30 RX ADMIN — ENOXAPARIN SODIUM SCH MG: 100 INJECTION SUBCUTANEOUS at 08:57

## 2020-11-30 RX ADMIN — INSULIN ASPART SCH UNIT: 100 INJECTION, SOLUTION INTRAVENOUS; SUBCUTANEOUS at 11:35

## 2020-11-30 RX ADMIN — INSULIN ASPART SCH UNIT: 100 INJECTION, SOLUTION INTRAVENOUS; SUBCUTANEOUS at 06:50

## 2020-11-30 RX ADMIN — HYDRALAZINE HYDROCHLORIDE PRN MG: 25 TABLET ORAL at 01:00

## 2020-11-30 RX ADMIN — DOCUSATE SODIUM SCH MG: 100 CAPSULE ORAL at 08:48

## 2020-11-30 RX ADMIN — DOCUSATE SODIUM SCH MG: 100 CAPSULE ORAL at 09:52

## 2020-11-30 NOTE — DISCHARGE SUMMARY
Diagnosis/Chief Complaint


Date of Admission


2020 at 20:40


Date of Discharge





Discharge Date:  2020


Admission Diagnosis


Acute respiratory failure due to COVID-19


Primary Care


[facility] Quezada Aprn


Discharge Diagnosis





(1) Acute respiratory failure due to COVID-19


Status:  Acute


(2) T2DM (type 2 diabetes mellitus)


Status:  Acute


(3) Steroid-induced hyperglycemia


Status:  Acute


(4) HTN (hypertension)


Status:  Chronic


(5) Morbid obesity


Status:  Chronic


(6) JEREL (acute kidney injury)


Status:  Acute





Discharge Summary


Discharge Physical Exam


Allergies:  


Coded Allergies:  


     metronidazole (Verified  Allergy, Unknown, vomiting, 20)


     erythromycin base (Verified  Adverse Reaction, Unknown, vomiting, 20)


Vitals & I&Os





Vital Signs








  Date Time  Temp Pulse Resp B/P (MAP) Pulse Ox O2 Delivery O2 Flow Rate FiO2


 


20 11:33  69 18 154/79 (104) 93 Room Air  


 


20 11:16       2.00 


 


20 04:19 36.5       











Hospital Course


Labs (last 24 hrs)


Laboratory Tests


20 16:11: Glucometer 268H


20 21:30: Glucometer 232H


20 05:09: 


Sodium Level 142, Potassium Level 3.5L, Chloride Level 107, Carbon Dioxide Level

21, Anion Gap 14, Blood Urea Nitrogen 14, Creatinine 0.63, Estimat Glomerular 

Filtration Rate > 60, BUN/Creatinine Ratio 22, Glucose Level 129H, Calcium Level

8.4L


20 11:28: Glucometer 164H





Microbiology


20 Blood Culture - Preliminary, Resulted


           No growth


20 Influenza Types A,B Antigen (NICO) - Final, Complete


           


Patient resulted labs reviewed.


Pending Labs


Laboratory Tests


20 05:09: 


Sodium Level 142, Potassium Level 3.5, Chloride Level 107, Carbon Dioxide Level 

21, Anion Gap 14, Blood Urea Nitrogen 14, Creatinine 0.63, Estimat Glomerular 

Filtration Rate > 60, BUN/Creatinine Ratio 22, Glucose Level 129, Calcium Level 

8.4


20 11:28: Glucometer 164





Imaging:  Reviewed Imaging Report





Discharge


Home Medications:





Active Scripts


Active


Levemir (Insulin Determir) 1,000 Units/10 Ml Soln 15 Unit SQ BID


Novolog (Insulin Aspart) 100 Unit/1 Ml Susp 8 Unit SC AC


Decadron (Dexamethasone) 6 Mg Tablet 6 Mg PO DAILY


Metoprolol Succinate 50 Mg Tab.er.24h 50 Mg PO DAILY 30 Days


     tAKE ONE TAB DAILY BY MOUTH AT BEDTIME.


Reported


Metformin HCl 1,000 Mg Tablet   


Telmisartan 40 Mg Tablet   





Instructions to patient/family


Please see electronic discharge instructions given to patient.





Clinical Quality Measures


DVT/VTE Risk/Contraindication:


Risk Factor Score Per Nursin


RFS Level Per Nursing on Admit:  4+=Very High





Problem Qualifiers





(1) T2DM (type 2 diabetes mellitus):  


Diabetes mellitus long term insulin use:  without long term use  Diabetes 

mellitus complication status:  with ketoacidosis  Diabetes mellitus complication

detail:  without coma  Qualified Codes:  E11.10 - Type 2 diabetes mellitus with 

ketoacidosis without coma








MELITA JUAREZ MD              2020 13:06

## 2020-11-30 NOTE — NUR
Diabetic Education orders rec'd.  Rapport established with patient, motivational 
interviewing leads to discussion of previous strategies used to treat diabetes, and enc. of 
returning to diabetic basics to find the areas she most needs help with for proper blood 
sugar control.  Packet given/accepted, states, "will look through it more when I get home."  
 Patient's ride arrives at hospital, and she states she is ready to go. Patient packed up 
with belongings and brought to door to meet ride by floor staff.

## 2020-11-30 NOTE — DISCHARGE INST-SIMPLE/STANDARD
Discharge Inst-Standard


Discharge Medications


New, Converted or Re-Newed RX:  Transmitted to Pharmacy





Patient Instructions/Follow Up


Plan of Care/Instructions/FU:  


Please continue to take your medications as written. Please follow up with


Dr Joyce to follow up this hospital stay.


Activity as Tolerated:  Yes


Discharge Diet:  No Restrictions


Return to The Hospital For:  


Chest pain, shortness of breath, fever, elevated blood sugars or blood


pressures, confusion, weakness, oxygen levels less than 90, if you feel


you are getting worse.











MELITA JUAREZ MD              Nov 30, 2020 10:01

## 2020-11-30 NOTE — NUR
Patient walked 112 feet on room air. Patient didn't need oxygen at this 

time.

-------------------------------------------------------------------------------

Addendum: 11/30/20 at 1201 by LISA PERSAUD RT

-------------------------------------------------------------------------------

Amended: Links added.